# Patient Record
Sex: MALE | Race: WHITE
[De-identification: names, ages, dates, MRNs, and addresses within clinical notes are randomized per-mention and may not be internally consistent; named-entity substitution may affect disease eponyms.]

---

## 2017-03-27 ENCOUNTER — HOSPITAL ENCOUNTER (OUTPATIENT)
Dept: HOSPITAL 31 - C.SDS | Age: 67
Setting detail: OBSERVATION
LOS: 2 days | Discharge: HOME | End: 2017-03-29
Attending: SURGERY | Admitting: SURGERY
Payer: MEDICARE

## 2017-03-27 VITALS — BODY MASS INDEX: 29 KG/M2

## 2017-03-27 VITALS — RESPIRATION RATE: 20 BRPM

## 2017-03-27 DIAGNOSIS — K42.9: Primary | ICD-10-CM

## 2017-03-27 DIAGNOSIS — J45.909: ICD-10-CM

## 2017-03-27 DIAGNOSIS — K43.2: ICD-10-CM

## 2017-03-27 DIAGNOSIS — Z85.038: ICD-10-CM

## 2017-03-27 LAB — INR PPP: 1

## 2017-03-27 PROCEDURE — 97162 PT EVAL MOD COMPLEX 30 MIN: CPT

## 2017-03-27 PROCEDURE — 49568: CPT

## 2017-03-27 PROCEDURE — 36415 COLL VENOUS BLD VENIPUNCTURE: CPT

## 2017-03-27 PROCEDURE — 88302 TISSUE EXAM BY PATHOLOGIST: CPT

## 2017-03-27 PROCEDURE — 97116 GAIT TRAINING THERAPY: CPT

## 2017-03-27 PROCEDURE — 85610 PROTHROMBIN TIME: CPT

## 2017-03-27 PROCEDURE — 49560: CPT

## 2017-03-27 RX ADMIN — HYDROMORPHONE HYDROCHLORIDE PRN MG: 1 INJECTION, SOLUTION INTRAMUSCULAR; INTRAVENOUS; SUBCUTANEOUS at 12:11

## 2017-03-27 RX ADMIN — HYDROMORPHONE HYDROCHLORIDE PRN MG: 1 INJECTION, SOLUTION INTRAMUSCULAR; INTRAVENOUS; SUBCUTANEOUS at 12:25

## 2017-03-27 RX ADMIN — HYDROMORPHONE HYDROCHLORIDE PRN MG: 1 INJECTION, SOLUTION INTRAMUSCULAR; INTRAVENOUS; SUBCUTANEOUS at 13:09

## 2017-03-27 RX ADMIN — HYDROMORPHONE HYDROCHLORIDE PRN MG: 1 INJECTION, SOLUTION INTRAMUSCULAR; INTRAVENOUS; SUBCUTANEOUS at 12:00

## 2017-03-27 NOTE — OP
PROCEDURE DATE: 03/27/2017



PREOPERATIVE DIAGNOSES:  Incisional/umbilical hernia.



POSTOPERATIVE DIAGNOSES:  Incisional/umbilical hernia.



PROCEDURE CARRIED OUT:  Repair of incisional hernia with mesh.



SURGEON:  Roel Conti MD



ASSISTANT:  Dr. Forrest, resident



ANESTHESIOLOGIST:  Dr. Khalil



The patient is a 66-year-old man who had a history of previous colectomy done through a midline incis
ion.  He has developed an incisional hernia just at the umbilicus.



OPERATIVE FINDINGS:

1.  No bowel was involved in the hernia.

2.  There was no evidence of any bowel injury during careful dissection of the hernia sac and the int
ra-abdominal contents.

3.  An 11 x 14 mesh Ventrio mesh was used.



PROCEDURE:  The patient was given general anesthesia, intravenous antibiotics, Venodyne boots were ap
plied.  A midline incision was made encompassing the previous incision.  The hernia was dissected vera
e.  The sac was dissected and removed.  We then placed the mesh in a completely unfurled pattern into
 the peritoneal cavity, sutured it at both ends and then secured it to the adjacent fascia.  Skin was
 closed with a subcuticular closure.  Marcaine was injected.  Blood loss of the procedure was less th
an 50 mL.



OPERATION CARRIED OUT:  Repair of incisional hernia with a Ventrio mesh.





__________________________________________

Roel Conti Jr., MD







cc:Teodoro Colorado MD



DD: 03/27/2017 11:39:31  [id number]

TT: 03/27/2017 12:35:53

Job # [id number]

en

## 2017-03-27 NOTE — PCM.SURG1
Surgeon's Initial Post Op Note





- Surgeon's Notes


Surgeon: Dr. Conti


Assistant: Dr. Forrest PGY2; Christiano AYOUB III


Type of Anesthesia: General Endo


Anesthesia Administered By: Remi


Pre-Operative Diagnosis: Incisional hernia


Operative Findings: same


Post-Operative Diagnosis: same


Operation Performed: Incisional Hernia repair w/ Mesh


Specimen/Specimens Removed: hernia sac


Estimated Blood Loss: EBL {In ML}: 50


Blood Products Given: N/A


Drains Used: No Drains


Post-Op Condition: Good


Date of Surgery/Procedure: 03/27/17


Time of Surgery/Procedure: 11:50

## 2017-03-29 VITALS — DIASTOLIC BLOOD PRESSURE: 83 MMHG | SYSTOLIC BLOOD PRESSURE: 131 MMHG | HEART RATE: 101 BPM

## 2017-03-29 VITALS — TEMPERATURE: 98.3 F | OXYGEN SATURATION: 95 %

## 2017-03-29 NOTE — CON
DATE: 03/28/2017



The patient chief complaining of abdominal pain ____.  The patient has history of CA of the colon ___
_  asthma .  The patient is a nonsmoker, nondrinker.  The patient uses albuterol, Advair.  



PHYSICAL EXAMINATION:

GENERAL:  The patient is awake, alert, oriented.

VITAL SIGNS:  Temperature is 98 ____ 

HEENT:  Within normal limits.

NECK:  Supple.

CHEST:  Symmetrical.

CARDIAC:  Regular.

ABDOMEN:  Soft.

EXTREMITIES:  No edema.



The patient suffers from abdominal hernia.  The patient ____ asthma medication.





__________________________________________

Teodoro Colorado MD







cc:



DD: 03/28/2017 10:14:12  [id number]

TT: 03/28/2017 13:19:08

Confirmation # [id number]

Dictation # [id number]

rn



03/29/2017 06:50:54

## 2017-03-29 NOTE — CP.PCM.PN
Subjective





- Date & Time of Evaluation


Date of Evaluation: 03/29/17


Time of Evaluation: 10:11





- Subjective


Subjective: 


doing well


dc home


fu office 1 week





Objective





- Vital Signs/Intake and Output


Vital Signs (last 24 hours): 


 











Temp Pulse Resp BP Pulse Ox


 


 98.3 F   98 H  20   129/85   95 


 


 03/29/17 08:05  03/29/17 08:05  03/29/17 08:05  03/29/17 08:05  03/29/17 08:05








Intake and Output: 


 











 03/29/17 03/29/17





 06:59 18:59


 


Intake Total 150 


 


Output Total 450 


 


Balance -300 














- Medications


Medications: 


 Current Medications





Acetaminophen (Tylenol 325mg Tab)  650 mg PO Q6 PRN


   PRN Reason: Headache


   Last Admin: 03/27/17 22:51 Dose:  650 mg


Montelukast Sodium (Singulair)  10 mg PO DAILY [facility]


   Last Admin: 03/29/17 09:40 Dose:  10 mg


Morphine Sulfate (Morphine)  4 mg IVP Q4 PRN


   PRN Reason: Pain, moderate (4-7)


   Last Admin: 03/28/17 10:19 Dose:  4 mg


Ondansetron HCl (Zofran Inj)  4 mg IVP Q6 PRN


   PRN Reason: Nausea/Vomiting


Oxycodone/Acetaminophen (Percocet 5/325 Mg Tab)  2 tab PO Q4 PRN


   PRN Reason: Pain, moderate (4-7)


   Stop: 03/30/17 11:51


Warfarin Sodium (Coumadin)  7.5 mg PO 1800 ZEB


Zolpidem Tartrate (Ambien)  5 mg PO HS [facility]


   Last Admin: 03/29/17 00:28 Dose:  5 mg











- Labs


Labs: 


 











PT  10.9 SECONDS (9.7-12.2)   03/27/17  09:01    


 


INR  1.0   03/27/17  09:01

## 2017-03-30 NOTE — PN
DATE: 03/29/2017



The patient is improving.  Pain is less.  _____ controlled.  The patient will go home today.





__________________________________________

Teodoro Colorado MD







cc:



DD: 03/29/2017 10:12:48  [id number]

TT: 03/29/2017 10:27:46

Confirmation # [id number]

Dictation # [id number]

en

## 2017-08-02 ENCOUNTER — HOSPITAL ENCOUNTER (EMERGENCY)
Dept: HOSPITAL 31 - C.ER | Age: 67
Discharge: HOME | End: 2017-08-02
Payer: MEDICARE

## 2017-08-02 VITALS
OXYGEN SATURATION: 99 % | RESPIRATION RATE: 18 BRPM | DIASTOLIC BLOOD PRESSURE: 88 MMHG | SYSTOLIC BLOOD PRESSURE: 146 MMHG | TEMPERATURE: 97.8 F | HEART RATE: 72 BPM

## 2017-08-02 VITALS — BODY MASS INDEX: 29 KG/M2

## 2017-08-02 DIAGNOSIS — M54.5: Primary | ICD-10-CM

## 2017-08-02 PROCEDURE — 99283 EMERGENCY DEPT VISIT LOW MDM: CPT

## 2017-08-02 PROCEDURE — 96372 THER/PROPH/DIAG INJ SC/IM: CPT

## 2017-08-02 NOTE — C.PDOC
History Of Present Illness


67 y/o male presents to ED with complaints of lower back pain for 2 days. 

Patient states he has herniated disc and sometimes gets back pain that resolves 

with medication but this time there is no improvement. Patient reports taking 

Percocet at 5pm and Valium at 12pm with no relief. Patient denies fever, chills

, urinary symptoms, nausea, vomiting, diarrhea or any other associated 

symptoms.  


Time Seen by Provider: 08/02/17 18:51


Chief Complaint (Nursing): Back Pain


History Per: Patient


History/Exam Limitations: no limitations


Onset/Duration Of Symptoms: Days


Current Symptoms Are (Timing): Still Present


Previous Symptoms: Back Pain





Past Medical History


Reviewed: Historical Data, Nursing Documentation, Vital Signs


Vital Signs: 


 Last Vital Signs











Temp  97.8 F   08/02/17 18:43


 


Pulse  72   08/02/17 18:43


 


Resp  18   08/02/17 18:43


 


BP  146/88   08/02/17 18:43


 


Pulse Ox  99   08/02/17 20:05














- Medical History


PMH: Anemia, Arthritis (BACK EPIDURAL), Asthma, COPD (ASTHMA), Deep Vein 

Thrombosis (b/l), Kidney Stones, Rheumatoid Arthritis


   Comment Only: Gall Bladder Disease (gallstones)


Surgical History: Endoscopy





- [facility] Procedures








EXCISION OF CECUM, ENDO, DIAGN (10/23/16)


EXCISION OF STOMACH, ENDO, DIAGN (10/23/16)


INTRODUCTION OF NUTRITIONAL INTO PERIPH VEIN, PERC APPROACH (10/23/16)


RESECTION OF ASCENDING COLON, OPEN APPROACH (10/23/16)


TRANSFUSE NONAUT FRESH PLASMA IN PERIPH ART, PERC (10/23/16)


TRANSFUSE NONAUT RED BLOOD CELLS IN PERIPH ART, PERC (10/23/16)








Family History: States: Unknown Family Hx





- Social History


Hx Alcohol Use: No


Hx Substance Use: No





- Immunization History


Hx Tetanus Toxoid Vaccination: No


Hx Influenza Vaccination: No


Hx Pneumococcal Vaccination: No





Review Of Systems


Constitutional: Negative for: Fever, Chills


Cardiovascular: Negative for: Chest Pain


Respiratory: Negative for: Shortness of Breath


Gastrointestinal: Negative for: Nausea, Vomiting, Diarrhea


Genitourinary: Negative for: Dysuria, Incontinence


Musculoskeletal: Positive for: Back Pain.  Negative for: Leg Pain


Skin: Negative for: Rash





Physical Exam





- Physical Exam


Appears: Non-toxic, No Acute Distress


Skin: Normal Color, Warm, Dry, No Rash


Head: Atraumatic, Normacephalic


Eye(s): bilateral: Normal Inspection


Oral Mucosa: Moist


Neck: Normal ROM, Supple


Chest: Symmetrical


Cardiovascular: Rhythm Regular


Respiratory: Normal Breath Sounds


Back: No Muscle Spasm, No Paraspinal Tenderness, Other (Paralumbar Tenderness)


Extremity: Normal ROM, Capillary Refill (<2 seconds)


Neurological/Psych: Oriented x3, Normal Motor, Normal Sensation





ED Course And Treatment


O2 Sat by Pulse Oximetry: 99 (RA)


Pulse Ox Interpretation: Normal





Medical Decision Making


Medical Decision Making: 


Impression: low back pain





Plan:


* Toradol, Valium, Lidoderm





Progress, Reassess and Dispo:


Upon reevaluation patient reports pain is improving. Patient is ambulatory 

without any difficulty, and his cane


He feels comfortable with discharge





Disposition


Counseled Patient/Family Regarding: Diagnosis, Need For Followup, Rx Given





- Disposition


Referrals: 


Teodoro Gallegos MD [Staff Provider] - 


Disposition: HOME/ ROUTINE


Disposition Time: 20:01


Condition: STABLE


Additional Instructions: 


Follow up with your primary medical doctor or clinic in 2-5 days for further 

evaluation. Continue with your usual medications as prescribed. Apply patch for 

12 hours then remove for 12 hours. Return to the emergency department at any 

time if symptoms persist or worsen.


Prescriptions: 


Lidocaine 5% [Lidoderm] 1 ea TD Q12 #12 patch


Instructions:  Acute Low Back Pain (DC)


Forms:  [facility] (English)





- POA


Present On Arrival: None





- Clinical Impression


Clinical Impression: 


 Low back pain








- PA / NP / Resident Statement


MD/DO has reviewed & agrees with the documentation as recorded.





- Scribe Statement


The provider has reviewed the documentation as recorded by the Tad Mendoza





All medical record entries made by the Tad were at my direction and 

personally dictated by me. I have reviewed the chart and agree that the record 

accurately reflects my personal performance of the history, physical exam, 

medical decision making, and the department course for this patient. I have 

also personally directed, reviewed, and agree with the discharge instructions 

and disposition.

## 2017-09-12 ENCOUNTER — HOSPITAL ENCOUNTER (OUTPATIENT)
Dept: HOSPITAL 31 - C.ER | Age: 67
Setting detail: OBSERVATION
LOS: 1 days | Discharge: HOME | End: 2017-09-13
Attending: INTERNAL MEDICINE | Admitting: INTERNAL MEDICINE
Payer: MEDICARE

## 2017-09-12 VITALS — BODY MASS INDEX: 29 KG/M2

## 2017-09-12 DIAGNOSIS — K64.8: ICD-10-CM

## 2017-09-12 DIAGNOSIS — F41.9: ICD-10-CM

## 2017-09-12 DIAGNOSIS — K59.00: ICD-10-CM

## 2017-09-12 DIAGNOSIS — K57.90: ICD-10-CM

## 2017-09-12 DIAGNOSIS — I10: ICD-10-CM

## 2017-09-12 DIAGNOSIS — K52.9: Primary | ICD-10-CM

## 2017-09-12 LAB
ALBUMIN/GLOB SERPL: 1.3 {RATIO} (ref 1–2.1)
ALP SERPL-CCNC: 53 U/L (ref 38–126)
ALT SERPL-CCNC: 50 U/L (ref 21–72)
APTT BLD: 27 SECONDS (ref 21–34)
AST SERPL-CCNC: 31 U/L (ref 17–59)
BASOPHILS # BLD AUTO: 0 K/UL (ref 0–0.2)
BASOPHILS NFR BLD: 0.4 % (ref 0–2)
BILIRUB SERPL-MCNC: 1.1 MG/DL (ref 0.2–1.3)
BILIRUB UR-MCNC: NEGATIVE MG/DL
BUN SERPL-MCNC: 11 MG/DL (ref 9–20)
CALCIUM SERPL-MCNC: 8.9 MG/DL (ref 8.6–10.4)
CHLORIDE SERPL-SCNC: 105 MMOL/L (ref 98–107)
CO2 SERPL-SCNC: 26 MMOL/L (ref 22–30)
COLOR UR: YELLOW
EOSINOPHIL # BLD AUTO: 0.1 K/UL (ref 0–0.7)
EOSINOPHIL NFR BLD: 2.1 % (ref 0–4)
ERYTHROCYTE [DISTWIDTH] IN BLOOD BY AUTOMATED COUNT: 13.5 % (ref 11.5–14.5)
GLOBULIN SER-MCNC: 3 GM/DL (ref 2.2–3.9)
GLUCOSE SERPL-MCNC: 74 MG/DL (ref 75–110)
GLUCOSE UR STRIP-MCNC: NORMAL MG/DL
HCT VFR BLD CALC: 43.6 % (ref 35–51)
INR PPP: 1.2
KETONES UR STRIP-MCNC: NEGATIVE MG/DL
LEUKOCYTE ESTERASE UR-ACNC: (no result) LEU/UL
LYMPHOCYTES # BLD AUTO: 2.4 K/UL (ref 1–4.3)
LYMPHOCYTES NFR BLD AUTO: 37.6 % (ref 20–40)
MCH RBC QN AUTO: 32 PG (ref 27–31)
MCHC RBC AUTO-ENTMCNC: 34.5 G/DL (ref 33–37)
MCV RBC AUTO: 93 FL (ref 80–94)
MONOCYTES # BLD: 0.7 K/UL (ref 0–0.8)
MONOCYTES NFR BLD: 11.5 % (ref 0–10)
NRBC BLD AUTO-RTO: 0 % (ref 0–2)
PH UR STRIP: 7 [PH] (ref 5–8)
PLATELET # BLD: 216 K/UL (ref 130–400)
PMV BLD AUTO: 7.9 FL (ref 7.2–11.7)
POTASSIUM SERPL-SCNC: 4.1 MMOL/L (ref 3.6–5.2)
PROT SERPL-MCNC: 7.1 G/DL (ref 6.3–8.3)
PROT UR STRIP-MCNC: NEGATIVE MG/DL
RBC # UR STRIP: NEGATIVE /UL
RBC #/AREA URNS HPF: 1 /HPF (ref 0–3)
SODIUM SERPL-SCNC: 140 MMOL/L (ref 132–148)
SP GR UR STRIP: 1.01 (ref 1–1.03)
UROBILINOGEN UR-MCNC: NORMAL MG/DL (ref 0.2–1)
WBC # BLD AUTO: 6.4 K/UL (ref 4.8–10.8)
WBC #/AREA URNS HPF: 8 /HPF (ref 0–5)

## 2017-09-12 PROCEDURE — 74177 CT ABD & PELVIS W/CONTRAST: CPT

## 2017-09-12 PROCEDURE — 96374 THER/PROPH/DIAG INJ IV PUSH: CPT

## 2017-09-12 PROCEDURE — 99284 EMERGENCY DEPT VISIT MOD MDM: CPT

## 2017-09-12 PROCEDURE — 88305 TISSUE EXAM BY PATHOLOGIST: CPT

## 2017-09-12 PROCEDURE — 81001 URINALYSIS AUTO W/SCOPE: CPT

## 2017-09-12 PROCEDURE — 85025 COMPLETE CBC W/AUTO DIFF WBC: CPT

## 2017-09-12 PROCEDURE — 45380 COLONOSCOPY AND BIOPSY: CPT

## 2017-09-12 PROCEDURE — 36415 COLL VENOUS BLD VENIPUNCTURE: CPT

## 2017-09-12 PROCEDURE — 85610 PROTHROMBIN TIME: CPT

## 2017-09-12 PROCEDURE — 71010: CPT

## 2017-09-12 PROCEDURE — 88342 IMHCHEM/IMCYTCHM 1ST ANTB: CPT

## 2017-09-12 PROCEDURE — 80053 COMPREHEN METABOLIC PANEL: CPT

## 2017-09-12 PROCEDURE — 86850 RBC ANTIBODY SCREEN: CPT

## 2017-09-12 PROCEDURE — 86900 BLOOD TYPING SEROLOGIC ABO: CPT

## 2017-09-12 PROCEDURE — 94640 AIRWAY INHALATION TREATMENT: CPT

## 2017-09-12 PROCEDURE — 88313 SPECIAL STAINS GROUP 2: CPT

## 2017-09-12 PROCEDURE — 85730 THROMBOPLASTIN TIME PARTIAL: CPT

## 2017-09-12 RX ADMIN — FLUTICASONE PROPIONATE AND SALMETEROL SCH: 50; 250 POWDER RESPIRATORY (INHALATION) at 21:26

## 2017-09-12 RX ADMIN — ALBUTEROL SULFATE SCH: 90 AEROSOL, METERED RESPIRATORY (INHALATION) at 21:26

## 2017-09-12 NOTE — C.PDOC
History Of Present Illness





68 Y/O MALE, HISTORY OF COLON CANCER AND DVT, PRESENTS TO ED WITH C/O "RED SPOTS

" OF BLOOD IN STOOL SINCE SUNDAY (3 DAYS). PT STATES HE HAS ALSO RECENTLY BEEN 

CONSTIPATED. NOTES HE WAS ADVISED BY PMD TO STOP TAKING COUMADIN SUNDAY (LAST 

TAKEN SATURDAY NIGHT). PT ALSO C/O LEFT SIDED ABDOMINAL PAIN. DENIES RECTAL PAIN

, FEVER, CHILLS, DIZZINESS, CHEST PAIN, NVD. 





REVIEW OF PRIOR RECORDS: colonoscopy October, 2016 (Trevin Walsh) showing onon-

bleeding gastritis, polyp.





EXAM


NAD, NONTOXIC


ABD: LLQ TENDERNESS


Time Seen by Provider: 09/12/17 15:29


Chief Complaint (Nursing): GI Problem


History Per: Patient


History/Exam Limitations: no limitations


Onset/Duration Of Symptoms: Days


Current Symptoms Are (Timing): Still Present


Quality Of Discomfort: "Pain"


Associated Symptoms: denies: Nausea, Vomiting, Diarrhea


Recent travel outside of the [place]: No





Past Medical History


Reviewed: Historical Data, Nursing Documentation, Vital Signs


Vital Signs: 


 Last Vital Signs











Temp  97.9 F   09/13/17 15:10


 


Pulse  77   09/13/17 15:10


 


Resp  20   09/13/17 15:10


 


BP  123/67   09/13/17 15:10


 


Pulse Ox  98   09/15/17 09:26














- Medical History


PMH: Anemia, Arthritis (BACK EPIDURAL), Asthma, COPD (ASTHMA), Deep Vein 

Thrombosis (b/l), Kidney Stones, Rheumatoid Arthritis


   Comment Only: Gall Bladder Disease (gallstones)


Surgical History: Endoscopy





- [facility] Procedures








EXCISION OF CECUM, ENDO, DIAGN (10/23/16)


EXCISION OF STOMACH, ENDO, DIAGN (10/23/16)


INTRODUCTION OF NUTRITIONAL INTO PERIPH VEIN, PERC APPROACH (10/23/16)


RESECTION OF ASCENDING COLON, OPEN APPROACH (10/23/16)


TRANSFUSE NONAUT FRESH PLASMA IN PERIPH ART, PERC (10/23/16)


TRANSFUSE NONAUT RED BLOOD CELLS IN PERIPH ART, PERC (10/23/16)








Family History: States: Unknown Family Hx





- Social History


Hx Alcohol Use: No


Hx Substance Use: No





- Immunization History


Hx Tetanus Toxoid Vaccination: No


Hx Influenza Vaccination: No


Hx Pneumococcal Vaccination: No





Review Of Systems


Except As Marked, All Systems Reviewed And Found Negative.


Constitutional: Negative for: Fever, Chills


Cardiovascular: Negative for: Chest Pain


Respiratory: Negative for: Cough, Shortness of Breath, Wheezing


Gastrointestinal: Positive for: Abdominal Pain, Hematochezia.  Negative for: 

Hematemesis, Rectal Pain


Skin: Negative for: Rash





Physical Exam





- Physical Exam


Appears: Non-toxic, No Acute Distress


Skin: Normal Color, Warm, Dry


Head: Atraumatic, Normacephalic


Oral Mucosa: Moist


Chest: Symmetrical


Cardiovascular: Rhythm Regular


Respiratory: Normal Breath Sounds, No Rales, No Rhonchi, No Wheezing


Gastrointestinal/Abdominal: Soft, Tenderness (LLQ), No Distention, No Guarding, 

No Rebound


Back: Normal Inspection, No CVA Tenderness


Extremity: Normal ROM, Capillary Refill (< 2 SEC.)


Neurological/Psych: Oriented x3, Normal Speech, Normal Cognition





ED Course And Treatment





- Laboratory Results


Result Diagrams: 


 09/13/17 11:12





 09/13/17 11:12


O2 Sat by Pulse Oximetry: 98 (RA)


Pulse Ox Interpretation: Normal





Progress





- Re-Evaluation


Re-evaluation Note: 





09/12/17 15:55


TREATED WITH PEPCID, IVFS. LABS, CT ABDOMEN/PELVIS ORDERED








ED OBSERVATION


Date of observation admission: 09/12/17


Time of observation admission: 15:30





- Observation admission statement


Patient is being placed in observation because:: 





GI BLEED ABD PAIN





- Goals of Observation


Goals of observation are:: 





NEG ACUTE ABD, ACUTE ANEMIA





- Progress Note


Progress Note: 





09/12/17 18:28


NO RECUR GI BLEED SINCE PRIOR EVAL. VSS.


09/12/17 18:56


S/O DR YARED DE JESUS CT, DISPO





Disposition





- Disposition


Disposition: HOSPITALIZED


Disposition Time: 20:30


Condition: STABLE





- POA


Present On Arrival: None





- Clinical Impression


Clinical Impression: 


 GI bleed








- Scribe Statement


The provider has reviewed the documentation as recorded by the Tad Hannah





All medical record entries made by the Sariibdimple were at my direction and 

personally dictated by me. I have reviewed the chart and agree that the record 

accurately reflects my personal performance of the history, physical exam, 

medical decision making, and the department course for this patient. I have 

also personally directed, reviewed, and agree with the discharge instructions 

and disposition.

## 2017-09-12 NOTE — CT
EXAM:

  CT Abdomen and Pelvis With Intravenous Contrast



EXAM DATE/TIME:

  Exam ordered 9/12/2017 3:53 PM



CLINICAL HISTORY:

  67 years old, male; Pain and condition or disease; Cancer; Intestine, large; 

Abdominal pain; Flank; Left lower quadrant (llq); Additional info: Gi bleeding 

llq pain ho colon ca



TECHNIQUE:

  Axial computed tomography images of the abdomen and pelvis with intravenous 

contrast.  All CT scans at this facility use one or more dose reduction 

techniques, viz.: automated exposure control; ma/kV adjustment per patient size 

(including targeted exams where dose is matched to indication; i.e. head); or 

iterative reconstruction technique.

  Coronal and sagittal reformatted images were created and reviewed.



CONTRAST:

  100 mL of visipaque 320 administered intravenously.



COMPARISON:

  CT - ABD PELVIS PO   IV CONTRAST 2/18/2017 12:16:27 AM



FINDINGS:

  Lower thorax: No acute findings.



 ABDOMEN:

  Liver:  Unremarkable.  No mass.

  Gallbladder and bile ducts: Gallstones are present. No ductal dilation.

  Pancreas:  Unremarkable.  No mass.  No ductal dilation.

  Spleen:  Unremarkable.  No splenomegaly.

  Adrenals:  Unremarkable.  No mass.

  Kidneys and ureters:  There is a 2.8 cm simple cyst in the upper pole the 

right kidney. There is a 1.2 mm calcification in the this lower pole the right 

kidney  No hydronephrosis.

  Stomach and bowel:  There is a wide necked supraumbilical midline abdominal 

hernia containing fat and small bowel. No strangulation.  Surgical clips are 

noted at the base of the cecum  No obstruction.  No mucosal thickening.

  Appendix:  No findings to suggest acute appendicitis.



 PELVIS:

  Bladder:  Unremarkable.  No mass.

  Reproductive:  The prostate measures 5.1 x 3.2 x 4.6 cm.



 ABDOMEN and PELVIS:

  Intraperitoneal space:  Unremarkable.  No free air.  No significant fluid 

collection.

  Bones/joints:  There is a grade 1 spondylolisthesis at L23 or 3 mm of 

anterolisthesis of L3 with respect to L2.  This is a there is a levoscoliosis 

of the lumbar spine.  No acute fracture.  No dislocation.

  Soft tissues:  See above.

  Vasculature:  There is an inferior vena cava filter present.  No abdominal 

aortic aneurysm.

  Lymph nodes:  Unremarkable.  No enlarged lymph nodes.





IMPRESSION:     

1. Nonobstructing 1.2 mm calcification in the lower pole right kidney.



2. Right renal cyst.



3. Midabdominal hernia. No strangulation



4. Gallstones

## 2017-09-13 VITALS
DIASTOLIC BLOOD PRESSURE: 67 MMHG | SYSTOLIC BLOOD PRESSURE: 123 MMHG | HEART RATE: 77 BPM | TEMPERATURE: 97.9 F | RESPIRATION RATE: 20 BRPM

## 2017-09-13 LAB
ALBUMIN/GLOB SERPL: 1.3 {RATIO} (ref 1–2.1)
ALP SERPL-CCNC: 53 U/L (ref 38–126)
ALT SERPL-CCNC: 48 U/L (ref 21–72)
AST SERPL-CCNC: 38 U/L (ref 17–59)
BASOPHILS # BLD AUTO: 0 K/UL (ref 0–0.2)
BASOPHILS NFR BLD: 0.5 % (ref 0–2)
BILIRUB SERPL-MCNC: 1.4 MG/DL (ref 0.2–1.3)
BUN SERPL-MCNC: 10 MG/DL (ref 9–20)
CALCIUM SERPL-MCNC: 8.5 MG/DL (ref 8.6–10.4)
CHLORIDE SERPL-SCNC: 106 MMOL/L (ref 98–107)
CO2 SERPL-SCNC: 24 MMOL/L (ref 22–30)
EOSINOPHIL # BLD AUTO: 0.1 K/UL (ref 0–0.7)
EOSINOPHIL NFR BLD: 2.2 % (ref 0–4)
ERYTHROCYTE [DISTWIDTH] IN BLOOD BY AUTOMATED COUNT: 13.7 % (ref 11.5–14.5)
GLOBULIN SER-MCNC: 2.9 GM/DL (ref 2.2–3.9)
GLUCOSE SERPL-MCNC: 70 MG/DL (ref 75–110)
HCT VFR BLD CALC: 42.2 % (ref 35–51)
LYMPHOCYTES # BLD AUTO: 1.7 K/UL (ref 1–4.3)
LYMPHOCYTES NFR BLD AUTO: 32.2 % (ref 20–40)
MCH RBC QN AUTO: 32.1 PG (ref 27–31)
MCHC RBC AUTO-ENTMCNC: 34.7 G/DL (ref 33–37)
MCV RBC AUTO: 92.5 FL (ref 80–94)
MONOCYTES # BLD: 0.6 K/UL (ref 0–0.8)
MONOCYTES NFR BLD: 11.3 % (ref 0–10)
NRBC BLD AUTO-RTO: 0 % (ref 0–2)
PLATELET # BLD: 215 K/UL (ref 130–400)
PMV BLD AUTO: 7.7 FL (ref 7.2–11.7)
POTASSIUM SERPL-SCNC: 3.8 MMOL/L (ref 3.6–5.2)
PROT SERPL-MCNC: 6.6 G/DL (ref 6.3–8.3)
SODIUM SERPL-SCNC: 139 MMOL/L (ref 132–148)
WBC # BLD AUTO: 5.3 K/UL (ref 4.8–10.8)

## 2017-09-13 RX ADMIN — FLUTICASONE PROPIONATE AND SALMETEROL SCH PUFF: 50; 250 POWDER RESPIRATORY (INHALATION) at 13:40

## 2017-09-13 RX ADMIN — ALBUTEROL SULFATE SCH INHALER: 90 AEROSOL, METERED RESPIRATORY (INHALATION) at 13:40

## 2017-09-13 RX ADMIN — ALBUTEROL SULFATE SCH: 90 AEROSOL, METERED RESPIRATORY (INHALATION) at 01:34

## 2017-09-13 RX ADMIN — ALBUTEROL SULFATE SCH: 90 AEROSOL, METERED RESPIRATORY (INHALATION) at 08:00

## 2017-09-13 NOTE — CP.PCM.PN
Subjective





- Date & Time of Evaluation


Date of Evaluation: 09/13/17


Time of Evaluation: 09:15





- Subjective


Subjective: 





PGY2 medicine progress note for Dr. Gallegos





Patient is a 67 year old male with PMHx colon cancer, protein C deficiency with 

history of DVTs on coumadin who presents to the hospital with complaint of 

bright red blood mixed in with stool for the past 3 days.  Patient states he 

discontinued his coumadin on Sunday because of the bleeding as he states he was 

told in the past he has internal hemorrhoids and thought they may be bleeding.  

Patient was concerned that he still saw blood mixed in stool after holding 

coumadin so he came to the hospital.  Patient follows with his oncologist Dr. Tahmina Zhang last month and follows up monthly.  Patient states he also 

follows with his surgeon Dr. Conti, and had hernia repair with mes in March 2017.  Patient states colon cancer was diagnosed one year ago after he came to 

the hospital with chest pain.  At that time, patient was found to be anemic, 

and upon further investigation, the colon mass was found and resected. 





PMHx: colon cancer, anemia, asthma, arthritis, kidney stones


PSHx: right colectomy, IVC filter, hernia repair








Objective





- Vital Signs/Intake and Output


Vital Signs (last 24 hours): 


 











Temp Pulse Resp BP Pulse Ox


 


 98.7 F   67   18   136/84   99 


 


 09/13/17 13:01  09/13/17 13:01  09/13/17 13:01  09/13/17 13:01  09/13/17 13:01








Intake and Output: 


 











 09/13/17 09/13/17





 06:59 18:59


 


Intake Total  600


 


Balance  600














- Medications


Medications: 


 Current Medications





Albuterol (Ventolin Hfa 90 Mcg/Actuation (8 G))  2 puff IH RQ4 ZEB


Diazepam (Valium)  5 mg PO DAILY PRN


   PRN Reason: Anxiety


Lidocaine (Lidoderm)  1 ea TD DAILY [facility]


Montelukast Sodium (Singulair)  10 mg PO HS [facility]


Oxycodone/Acetaminophen (Percocet 5/325 Mg Tab)  1 tab PO Q6 PRN


   PRN Reason: Pain, moderate (4-7)


   Stop: 09/16/17 00:01


Pantoprazole Sodium (Protonix Inj)  40 mg IVP DAILY [facility]


   Last Admin: 09/13/17 11:00 Dose:  40 mg


Polyethylene Glycol (Miralax)  17 gm PO DAILY [facility]


Fluticasone/Salmeterol (Advair Diskus 250/50)  1 puff IH RQ12 ZEB


Zolpidem Tartrate (Ambien)  10 mg PO HS [facility]











- Labs


Labs: 


 





 09/13/17 11:12 





 09/13/17 11:12 





 











PT  13.6 SECONDS (9.7-12.2)  H  09/12/17  15:59    


 


INR  1.2   09/12/17  15:59    


 


APTT  27 SECONDS (21-34)   09/12/17  15:59    














- Constitutional


Appears: Non-toxic, No Acute Distress





- Head Exam


Head Exam: ATRAUMATIC, NORMOCEPHALIC





- Eye Exam


Eye Exam: EOMI





- ENT Exam


ENT Exam: Mucous Membranes Moist





- Respiratory Exam


Respiratory Exam: Clear to Ausculation Bilateral





- Cardiovascular Exam


Cardiovascular Exam: +S1, +S2





- GI/Abdominal Exam


GI & Abdominal Exam: Soft, Tenderness (mild LLQ), Normal Bowel Sounds.  absent: 

Distended, Firm, Guarding


Additional comments: 





well-healed midline incision.  hernia palpable with cough but reduces





- Extremities Exam


Extremities Exam: Normal Inspection.  absent: Pedal Edema





- Neurological Exam


Neurological Exam: Alert, Awake





- Psychiatric Exam


Psychiatric exam: Normal Affect





- Skin


Skin Exam: Dry, Warm





Assessment and Plan





- Assessment and Plan (Free Text)


Assessment: 





Blood in stool


Patient s/p colonoscopy with Dr. Walsh: colonoscopy to cecum: colitis, 

diverticulosis, internal hemorrhoids


continue protonix 40mg IV daily


continue miralax daily to avoid straining





CT abd/ pelvis with PO & IV contrast: 1.2mm stone lower right kidney, right 

renal cyst, mid abdomen hernia containing fat and small bowel, no 

strangulation.  no free air.  IVC filter present. 





Asthma


continue singulair, advair, albuterol inhaler





Arthritis


continue lidocaine patch





Anxiety


continue valium 5mg Po daiy prn





Hx protein C deficiency


holding coumadin for now





Prophylactic measure


SCDs


protonix IV daily





All medical management as per Dr. Gallegos

## 2017-09-14 NOTE — CON
DATE:  09/12/2017



From Dr. Francine Reddy to Dr. Teodoro Gallegos.



I was called for GI consultation by the admitting medical team.  The

patient is seen and fully examined on 09/12/2017 as requested by the

admitting medical staff for GI consultation.  The entire chart is reviewed.

All the available lab and radiology study results, current and previous

medication list, current and previous medical events, and allergy to

medication list ware reviewed.  Case discussed at length with the admitting

medical staff.



HISTORY OF PRESENT ILLNESS:  This is a 67-year-old male who was admitted to

the hospital through the emergency room with generalized weakness, malaise,

diarrhea, postprandial abdominal distention with recently reported body

weight loss.  The patient reported rectal bleeding, on and off with fresh

blood and black clots for the last 2-3 days prior to admission as well as

about 7-10 days also.  The patient had been on Coumadin, which was advised

to stop it prior to admission.  No chest pain, palpitations are reported. 

Significant complaint of shortness of breath.



PAST MEDICAL HISTORY:  Including, but not limited to:

1.  COPD.

2.  Deep venous thrombosis, has been on Coumadin.

3.  Renal stone.

4.  History of rheumatoid arthritis.

5.  Peptic ulcer disease.

6.  Known history of colon polyp.

7.  History of periods of anemia.



CURRENT MEDICATIONS:  Medication lists were reviewed.



ALLERGIES:  Allergic to medication unclear.



PAST SURGICAL HISTORY:  Including;

1.  Colonoscopy with polypectomy.

2.  The patient also has also history of gallbladder stone.



FAMILY HISTORY:  Unknown.



SOCIAL HISTORY:  No known history of cigarette smoking or alcohol intake.



After being admitted to the hospital, the patient was found to have normal

CBC, which include normal H&H, believed to be secondary to

hemoconcentration.  His blood glucose level initially was low at 74 with

low creatinine of 0.7.



PHYSICAL EXAMINATION:

GENERAL:  A 67-year-old male appeared to be awake, alert, and oriented.

VITAL SIGNS:  Afebrile with respiratory rate of 20 to 22, pulse 74, and

blood pressure 152/80.

HEENT:  Showed dry oral mucous membrane.  Nonicteric sclerae.

LYMPH NODES:  No lymphadenitis or lymphadenopathy.

LUNGS:  Scattered mild crepitation.  Breathing sounds are present

bilaterally.

HEART:  Positive S1 and S2.

ABDOMEN:  Soft with mild tenderness and distention, but mainly moderate

left-sided tenderness.  No mass or organomegaly.  No rebound tenderness or

guarding.

RECTAL:  Positive blood with trace of old and fresh blood on _____.

EXTREMITIES:  Mild lower extremity edematous changes.  No clubbing or

cyanosis.

NEUROLOGIC:  Peripheral pulses are present bilaterally, but weak.  No

reported new neurological deficits, sensory or motor.



IMPRESSION:

1.  Gastrointestinal bleeding, loss.

2.  Known history of colon polyp.

3.  Re-exacerbation of peptic ulcer disease.

4.  Multiple past medical history including, but not limited to chronic

obstructive pulmonary disease, rheumatoid arthritis, renal stone, deep

venous thrombosis with hypertension, as well as cholelithiasis, by history.



SUGGESTIONS:

1.  Continue current management.

2.  Correct an underlying coagulopathy if any.

3.  Keeping in mind the patient's known history of colon polyp, colonoscopy

is to be scheduled after adequate preparation.



Thank you for letting me to participate in your patient's case management. 

Further recommendation to followup of colonoscopy.







__________________________________________

Francine Reddy MD



DD:  09/13/2017 19:46:50

DT:  09/14/2017 0:21:02

Job # [id number]

## 2017-09-15 VITALS — OXYGEN SATURATION: 98 %

## 2019-04-19 ENCOUNTER — HOSPITAL ENCOUNTER (OUTPATIENT)
Dept: HOSPITAL 31 - C.ER | Age: 69
Setting detail: OBSERVATION
LOS: 1 days | Discharge: HOME | End: 2019-04-20
Attending: EMERGENCY MEDICINE | Admitting: EMERGENCY MEDICINE
Payer: COMMERCIAL

## 2019-04-19 VITALS — RESPIRATION RATE: 20 BRPM

## 2019-04-19 VITALS — BODY MASS INDEX: 29.7 KG/M2

## 2019-04-19 DIAGNOSIS — Z95.828: ICD-10-CM

## 2019-04-19 DIAGNOSIS — Z87.442: ICD-10-CM

## 2019-04-19 DIAGNOSIS — I82.411: ICD-10-CM

## 2019-04-19 DIAGNOSIS — Z85.038: ICD-10-CM

## 2019-04-19 DIAGNOSIS — I12.9: ICD-10-CM

## 2019-04-19 DIAGNOSIS — G89.29: ICD-10-CM

## 2019-04-19 DIAGNOSIS — I80.02: Primary | ICD-10-CM

## 2019-04-19 DIAGNOSIS — I82.512: ICD-10-CM

## 2019-04-19 DIAGNOSIS — Z79.01: ICD-10-CM

## 2019-04-19 DIAGNOSIS — D68.51: ICD-10-CM

## 2019-04-19 DIAGNOSIS — N18.9: ICD-10-CM

## 2019-04-19 DIAGNOSIS — J44.9: ICD-10-CM

## 2019-04-19 DIAGNOSIS — M06.9: ICD-10-CM

## 2019-04-19 DIAGNOSIS — M54.30: ICD-10-CM

## 2019-04-19 DIAGNOSIS — Z87.19: ICD-10-CM

## 2019-04-19 LAB
ALBUMIN SERPL-MCNC: 4.1 G/DL (ref 3.5–5)
ALBUMIN/GLOB SERPL: 1.5 {RATIO} (ref 1–2.1)
ALT SERPL-CCNC: 37 U/L (ref 21–72)
APTT BLD: 34 SECONDS (ref 21–34)
AST SERPL-CCNC: 43 U/L (ref 17–59)
BASOPHILS # BLD AUTO: 0 K/UL (ref 0–0.2)
BASOPHILS NFR BLD: 0.4 % (ref 0–2)
BILIRUB UR-MCNC: NEGATIVE MG/DL
BUN SERPL-MCNC: 19 MG/DL (ref 9–20)
CALCIUM SERPL-MCNC: 9.1 MG/DL (ref 8.6–10.4)
D DIMER: 874 NG/MLDDU (ref 0–243)
EOSINOPHIL # BLD AUTO: 0.1 K/UL (ref 0–0.7)
EOSINOPHIL NFR BLD: 1 % (ref 0–4)
ERYTHROCYTE [DISTWIDTH] IN BLOOD BY AUTOMATED COUNT: 14.2 % (ref 11.5–14.5)
ERYTHROCYTE [SEDIMENTATION RATE] IN BLOOD: 2 MM/HR (ref 0–15)
GFR NON-AFRICAN AMERICAN: > 60
GLUCOSE UR STRIP-MCNC: NORMAL MG/DL
HGB BLD-MCNC: 14.9 G/DL (ref 12–18)
INR PPP: 2.4
LEUKOCYTE ESTERASE UR-ACNC: (no result) LEU/UL
LYMPHOCYTES # BLD AUTO: 2.2 K/UL (ref 1–4.3)
LYMPHOCYTES NFR BLD AUTO: 28.1 % (ref 20–40)
MCH RBC QN AUTO: 32.5 PG (ref 27–31)
MCHC RBC AUTO-ENTMCNC: 34.4 G/DL (ref 33–37)
MCV RBC AUTO: 94.4 FL (ref 80–94)
MONOCYTES # BLD: 0.8 K/UL (ref 0–0.8)
MONOCYTES NFR BLD: 10.4 % (ref 0–10)
NEUTROPHILS # BLD: 4.7 K/UL (ref 1.8–7)
NEUTROPHILS NFR BLD AUTO: 60.1 % (ref 50–75)
NRBC BLD AUTO-RTO: 0.2 % (ref 0–2)
PH UR STRIP: 6 [PH] (ref 5–8)
PLATELET # BLD: 220 K/UL (ref 130–400)
PMV BLD AUTO: 7.7 FL (ref 7.2–11.7)
PROT UR STRIP-MCNC: NEGATIVE MG/DL
PROTHROMBIN TIME: 25.9 SECONDS (ref 9.7–12.2)
RBC # BLD AUTO: 4.59 MIL/UL (ref 4.4–5.9)
RBC # UR STRIP: NEGATIVE /UL
SP GR UR STRIP: 1.02 (ref 1–1.03)
SQUAMOUS EPITHIAL: < 1 /HPF (ref 0–5)
UROBILINOGEN UR-MCNC: NORMAL MG/DL (ref 0.2–1)
WBC # BLD AUTO: 7.8 K/UL (ref 4.8–10.8)

## 2019-04-19 PROCEDURE — 96372 THER/PROPH/DIAG INJ SC/IM: CPT

## 2019-04-19 PROCEDURE — 83735 ASSAY OF MAGNESIUM: CPT

## 2019-04-19 PROCEDURE — 99285 EMERGENCY DEPT VISIT HI MDM: CPT

## 2019-04-19 PROCEDURE — 86140 C-REACTIVE PROTEIN: CPT

## 2019-04-19 PROCEDURE — 85025 COMPLETE CBC W/AUTO DIFF WBC: CPT

## 2019-04-19 PROCEDURE — 71045 X-RAY EXAM CHEST 1 VIEW: CPT

## 2019-04-19 PROCEDURE — 82550 ASSAY OF CK (CPK): CPT

## 2019-04-19 PROCEDURE — 85730 THROMBOPLASTIN TIME PARTIAL: CPT

## 2019-04-19 PROCEDURE — 85378 FIBRIN DEGRADE SEMIQUANT: CPT

## 2019-04-19 PROCEDURE — 93970 EXTREMITY STUDY: CPT

## 2019-04-19 PROCEDURE — 96374 THER/PROPH/DIAG INJ IV PUSH: CPT

## 2019-04-19 PROCEDURE — 81001 URINALYSIS AUTO W/SCOPE: CPT

## 2019-04-19 PROCEDURE — 87040 BLOOD CULTURE FOR BACTERIA: CPT

## 2019-04-19 PROCEDURE — 80053 COMPREHEN METABOLIC PANEL: CPT

## 2019-04-19 PROCEDURE — 85651 RBC SED RATE NONAUTOMATED: CPT

## 2019-04-19 PROCEDURE — 36415 COLL VENOUS BLD VENIPUNCTURE: CPT

## 2019-04-19 PROCEDURE — 85610 PROTHROMBIN TIME: CPT

## 2019-04-19 PROCEDURE — 84100 ASSAY OF PHOSPHORUS: CPT

## 2019-04-19 PROCEDURE — 87086 URINE CULTURE/COLONY COUNT: CPT

## 2019-04-19 NOTE — C.PDOC
History Of Present Illness


67 y/o M c PMHx DVT s/p IVC filter on warfarin p/w L calf swelling, pain, 

erythema that he noticed over the past 4 days. Denies fever, chills, chest pain,

dyspnea, trauma, vomiting. Also reports chronic back pain that radiates down L 

leg which is consistent with past sciatica. 


Time Seen by Provider: 04/19/19 18:02


Chief Complaint (Nursing): Lower Extremity Problem/Injury





Past Medical History


Vital Signs: 





                                Last Vital Signs











Temp  99 F   04/19/19 16:43


 


Pulse  53 L  04/19/19 16:43


 


Resp  18   04/19/19 16:43


 


BP  134/78   04/19/19 16:43


 


Pulse Ox  95   04/19/19 16:43














- Medical History


PMH: Anemia, Arthritis (BACK EPIDURAL), Asthma, COPD (ASTHMA), Deep Vein Throm

bosis (b/l), Gall Bladder Disease (gallstones), Kidney Stones, Chronic Kidney 

Disease, Rheumatoid Arthritis


Surgical History: Endoscopy





- [facility] Procedures











EXCISION OF CECUM, ENDO, DIAGN (10/23/16)


EXCISION OF STOMACH, ENDO, DIAGN (10/23/16)


INTRODUCTION OF NUTRITIONAL INTO PERIPH VEIN, PERC APPROACH (10/23/16)


RESECTION OF ASCENDING COLON, OPEN APPROACH (10/23/16)


TRANSFUSE NONAUT FRESH PLASMA IN PERIPH ART, PERC (10/23/16)


TRANSFUSE NONAUT RED BLOOD CELLS IN PERIPH ART, PERC (10/23/16)








Family History: States: Unknown Family Hx





- Social History


Hx Alcohol Use: No


Hx Substance Use: No





- Immunization History


Hx Tetanus Toxoid Vaccination: No


Hx Influenza Vaccination: Yes


Hx Pneumococcal Vaccination: Yes





Review Of Systems


Except As Marked, All Systems Reviewed And Found Negative.


Constitutional: Negative for: Fever


Cardiovascular: Negative for: Chest Pain





Physical Exam





- Physical Exam


Additional Physical Exam Comments: 








Constitutional: No acute distress.


Head: Normocephalic. Atraumatic.


Eyes: PERRL.


ENT: Moist mucous membranes.


Neck: Supple.


Cardiovascular: Regular rate. Radial pulse 2+ bilaterally.


Chest: No tenderness.


Respiratory: Clear to auscultation bilaterally.


GI: Soft. Nontender. Nondistended.


Back: No midline tenderness.


Musculoskeletal: L calf with swelling, tenderness and posterior erythema that 

stops at knee.


Skin: As above.


Neurologic: Alert, no focal deficit.











ED Course And Treatment





- Laboratory Results


Result Diagrams: 


                                 04/19/19 19:03





                                 04/19/19 19:03


O2 Sat by Pulse Oximetry: 95





Medical Decision Making


Medical Decision Making: 


Patient with area of erythema which may be cellulitis but may be secondary to 

DVT. Dimer elevated, likely DVT even though patient therapeutic on warfarin, 

requires further anticoagulation as well as observation of erythema for spread 

and doppler to rule out DVT when they arrive in AM.





Disposition





- Disposition


Disposition: HOSPITALIZED


Disposition Time: 20:03


Condition: FAIR


Forms:  [facility] (English)





- Clinical Impression


Clinical Impression: 


 Calf swelling, Elevated d-dimer, Skin erythema

## 2019-04-19 NOTE — CP.PCM.HP
<Mark Tate - Last Filed: 04/20/19 00:05>





History of Present Illness





- History of Present Illness


History of Present Illness: 


H&P for hospitalist Dr. Wesley





Patient's Primary doctor Dr. Gallegos, hospitalist group covering Dr. Gallegos.





68M w/ PMhx of chronic DVTs (protein C deficiency) on coumadin & s/p IVC filter 

2004, colon cancer s/p resection of tumor, HTN, asthma, diverticulosis presents 

to ED for left leg pain. Patient reports pain began suddenly 3 days prior with 

redness to his calf area. Patient reports movement, including walking worsens 

the pain. Patient reports compliance with his coumadin. Patient denies recent 

travel, illness. Patient denies headaches, vision changes, chest pain, SOB, 

cough, congestion, abdominal pain, nausea, vomiting, diarrhea, constipation, 

fevers, chills. 





PMD: Dr. Gallegos


PMhx: chronic DVTs (protein C deficiency) on coumadin & s/p IVC filter 2004, 

colon cancer s/p resection of tumor, HTN, asthma, diverticulosis 


Meds: Norvasc 5 mg daily, coumadin 5 mg 5 days a week, 7.5 mg 2 days a week, 

percocet, ambien, albuterol, advair, singular


PSHx: right colectomy, IVC filter, hernia repair


SHx: Denies smoking, ETOH, illicit drug use 


Allergies: NKDA








Present on Admission





- Present on Admission


Any Indicators Present on Admission: Yes


History of DVT/PE: Yes


History of Uncontrolled Diabetes: No


Urinary Catheter: No


Decubitus Ulcer Present: No





Review of Systems





- Constitutional


Constitutional: absent: Chills, Fever





- EENT


Eyes: absent: Blurred Vision, Change in Vision


Ears: absent: Ear Pain


Nose/Mouth/Throat: absent: Nasal Congestion, Nasal Discharge





- Cardiovascular


Cardiovascular: absent: Chest Pain, Chest Pain at Rest, Dyspnea





- Respiratory


Respiratory: absent: Dyspnea, Dyspnea on Exertion





- Gastrointestinal


Gastrointestinal: absent: Abdominal Pain, Constipation





- Genitourinary


Genitourinary: absent: Change in Urinary Stream, Difficulty Urinating





- Musculoskeletal


Musculoskeletal: absent: Abnormal Gait, Arthralgias





- Integumentary


Integumentary: Skin Pain.  absent: Acne, Alopecia


Additional comments: 





Left lower extremity swelling, erythema 





- Neurological


Neurological: absent: Dizziness, Headaches





- Psychiatric


Psychiatric: absent: Confusion, Depression





Past Patient History





- Infectious Disease


Hx of Infectious Diseases: None





- Past Medical History & Family History


Past Medical History?: Yes





- Past Social History


Smoking Status: Never Smoked





- CARDIAC


Hx Cardiac Disorders: Yes





- PULMONARY


Hx Asthma: Yes


Hx Chronic Obstructive Pulmonary Disease (COPD): Yes (ASTHMA)





- NEUROLOGICAL


Hx Neurological Disorder: No





- HEENT


Hx HEENT Problems: Yes (eye allergies)





- RENAL


Hx Chronic Kidney Disease: Yes


Hx Kidney Stones: Yes





- ENDOCRINE/METABOLIC


Hx Endocrine Disorders: No





- HEMATOLOGICAL/ONCOLOGICAL


Hx Anemia: Yes





- INTEGUMENTARY


Hx Dermatological Problems: No





- MUSCULOSKELETAL/RHEUMATOLOGICAL


Hx Arthritis: Yes (BACK EPIDURAL)


Hx Rheumatoid Arthritis: Yes





- GASTROINTESTINAL


Hx Gall Bladder Disease: Yes (gallstones)





- GENITOURINARY/GYNECOLOGICAL


Hx Genitourinary Disorders: No





- PSYCHIATRIC


Hx Substance Use: No





- SURGICAL HISTORY


Hx Surgeries: Yes


Hx Herniorrhaphy: Yes (umbilical)


Other/Comment: colon cancer removed, hernia with mesh





- ANESTHESIA


Hx Anesthesia: Yes


Hx Anesthesia Reactions: No


Hx Malignant Hyperthermia: No





Meds


Allergies/Adverse Reactions: 


                                    Allergies











Allergy/AdvReac Type Severity Reaction Status Date / Time


 


No Known Allergies Allergy   Verified 04/19/19 16:43














Physical Exam





- Constitutional


Appears: Non-toxic, No Acute Distress





- Head Exam


Head Exam: NORMAL INSPECTION





- Eye Exam


Eye Exam: EOMI, Normal appearance





- ENT Exam


ENT Exam: Mucous Membranes Moist





- Neck Exam


Neck exam: Positive for: Normal Inspection





- Respiratory Exam


Respiratory Exam: Clear to Auscultation Bilateral, NORMAL BREATHING PATTERN.  

absent: Rales, Rhonchi, Wheezes





- Cardiovascular Exam


Cardiovascular Exam: +S1, +S2.  absent: Systolic Murmur





- GI/Abdominal Exam


GI & Abdominal Exam: Normal Bowel Sounds, Soft.  absent: Distended, Firm, 

Guarding, Tenderness





- Extremities Exam


Extremities exam: Positive for: tenderness, pedal pulses present.  Negative for:

pedal edema


Additional comments: 





erythema present on left calf in linear pattern


warm on palpation to area


pain on palpation


raised, interval like ridges of vessel along possibly small saphenous vein 








- Psychiatric Exam


Psychiatric exam: Normal Affect, Normal Mood





- Skin


Skin Exam: Dry, Intact, Normal Color, Warm





Results





- Vital Signs


Recent Vital Signs: 





                                Last Vital Signs











Temp  99 F   04/19/19 16:43


 


Pulse  65   04/19/19 20:30


 


Resp  15   04/19/19 20:30


 


BP  119/78   04/19/19 20:30


 


Pulse Ox  95   04/19/19 20:53














- Labs


Result Diagrams: 


                                 04/19/19 19:03





                                 04/19/19 19:03


Labs: 





                         Laboratory Results - last 24 hr











  04/19/19 04/19/19 04/19/19





  19:00 19:03 19:03


 


WBC   7.8 


 


RBC   4.59 


 


Hgb   14.9 


 


Hct   43.3 


 


MCV   94.4 H 


 


MCH   32.5 H 


 


MCHC   34.4 


 


RDW   14.2 


 


Plt Count   220 


 


MPV   7.7 


 


Neut % (Auto)   60.1 


 


Lymph % (Auto)   28.1 


 


Mono % (Auto)   10.4 H 


 


Eos % (Auto)   1.0 


 


Baso % (Auto)   0.4 


 


Neut # (Auto)   4.7 


 


Lymph # (Auto)   2.2 


 


Mono # (Auto)   0.8 


 


Eos # (Auto)   0.1 


 


Baso # (Auto)   0.0 


 


PT   


 


INR   


 


APTT   


 


D-Dimer, Quantitative   


 


Sodium    138


 


Potassium    4.2


 


Chloride    104


 


Carbon Dioxide    26


 


Anion Gap    12


 


BUN    19


 


Creatinine    0.8


 


Est GFR ( Amer)    > 60


 


Est GFR (Non-Af Amer)    > 60


 


Random Glucose    80


 


Calcium    9.1


 


Phosphorus    3.3


 


Magnesium    2.1


 


Total Bilirubin    0.5


 


AST    43


 


ALT    37


 


Alkaline Phosphatase    54


 


Total Creatine Kinase    62


 


C-React Prot High Sens  0.34 L  


 


Total Protein    6.8


 


Albumin    4.1


 


Globulin    2.7


 


Albumin/Globulin Ratio    1.5


 


Urine Color   


 


Urine Clarity   


 


Urine pH   


 


Ur Specific Gravity   


 


Urine Protein   


 


Urine Glucose (UA)   


 


Urine Ketones   


 


Urine Blood   


 


Urine Nitrate   


 


Urine Bilirubin   


 


Urine Urobilinogen   


 


Ur Leukocyte Esterase   


 


Urine WBC (Auto)   


 


Urine RBC (Auto)   


 


Ur Squamous Epith Cells   














  04/19/19 04/19/19





  19:03 19:19


 


WBC  


 


RBC  


 


Hgb  


 


Hct  


 


MCV  


 


MCH  


 


MCHC  


 


RDW  


 


Plt Count  


 


MPV  


 


Neut % (Auto)  


 


Lymph % (Auto)  


 


Mono % (Auto)  


 


Eos % (Auto)  


 


Baso % (Auto)  


 


Neut # (Auto)  


 


Lymph # (Auto)  


 


Mono # (Auto)  


 


Eos # (Auto)  


 


Baso # (Auto)  


 


PT  25.9 H 


 


INR  2.4 


 


APTT  34 


 


D-Dimer, Quantitative  874 H 


 


Sodium  


 


Potassium  


 


Chloride  


 


Carbon Dioxide  


 


Anion Gap  


 


BUN  


 


Creatinine  


 


Est GFR ( Amer)  


 


Est GFR (Non-Af Amer)  


 


Random Glucose  


 


Calcium  


 


Phosphorus  


 


Magnesium  


 


Total Bilirubin  


 


AST  


 


ALT  


 


Alkaline Phosphatase  


 


Total Creatine Kinase  


 


C-React Prot High Sens  


 


Total Protein  


 


Albumin  


 


Globulin  


 


Albumin/Globulin Ratio  


 


Urine Color   Yellow


 


Urine Clarity   Hazy


 


Urine pH   6.0


 


Ur Specific Gravity   1.019


 


Urine Protein   Negative


 


Urine Glucose (UA)   Normal


 


Urine Ketones   Negative


 


Urine Blood   Negative


 


Urine Nitrate   Negative


 


Urine Bilirubin   Negative


 


Urine Urobilinogen   Normal


 


Ur Leukocyte Esterase   Neg


 


Urine WBC (Auto)   1


 


Urine RBC (Auto)   3


 


Ur Squamous Epith Cells   < 1














Assessment & Plan





- Assessment and Plan (Free Text)


Assessment: 





68M w/ PMhx of chronic DVTs (protein C deficiency) on coumadin & s/p IVC filter 

2004, colon cancer s/p resection of tumor, HTN, asthma, diverticulosis presents 

to ED for left leg pain


Plan: 





Chronic DVT w/ failed therapy vs superficial thrombophlebitis


- elevated D-dimmer in 800s


- INR currently therapeutics at 2.4


- Lovenox 80 SC given in ED


- daily INR monitoring


- c/w home medication coumadin 5 mg HS, adjust according to AM 


- f/u venous duplex b/l


- start vanocmycin 1g Q12H & ceftriaxone 1gm daily for possible superficial skin

infection 





History of HTN


- c/w home medication norvasc 5 mg daily





History of asthma


- c/w home medication singulair 10 mg daily, pulmicort 


- duonebs PRN





History of difficulty to sleep


- c/w home medication ambien  mg PO HS





Ppx


- DVT: INR in theraputic range of 2.4, serial INRs 


 





<Pao Wesley N - Last Filed: 04/20/19 05:08>





Results





- Vital Signs


Recent Vital Signs: 





                                Last Vital Signs











Temp  98.1 F   04/19/19 23:51


 


Pulse  70   04/19/19 23:51


 


Resp  20   04/19/19 23:51


 


BP  108/62   04/19/19 23:51


 


Pulse Ox  96   04/20/19 02:20














- Labs


Result Diagrams: 


                                 04/19/19 19:03





                                 04/19/19 19:03


Labs: 





                         Laboratory Results - last 24 hr











  04/19/19 04/19/19 04/19/19





  19:00 19:03 19:03


 


WBC   7.8 


 


RBC   4.59 


 


Hgb   14.9 


 


Hct   43.3 


 


MCV   94.4 H 


 


MCH   32.5 H 


 


MCHC   34.4 


 


RDW   14.2 


 


Plt Count   220 


 


MPV   7.7 


 


Neut % (Auto)   60.1 


 


Lymph % (Auto)   28.1 


 


Mono % (Auto)   10.4 H 


 


Eos % (Auto)   1.0 


 


Baso % (Auto)   0.4 


 


Neut # (Auto)   4.7 


 


Lymph # (Auto)   2.2 


 


Mono # (Auto)   0.8 


 


Eos # (Auto)   0.1 


 


Baso # (Auto)   0.0 


 


ESR   2 


 


PT   


 


INR   


 


APTT   


 


D-Dimer, Quantitative   


 


Sodium    138


 


Potassium    4.2


 


Chloride    104


 


Carbon Dioxide    26


 


Anion Gap    12


 


BUN    19


 


Creatinine    0.8


 


Est GFR ( Amer)    > 60


 


Est GFR (Non-Af Amer)    > 60


 


Random Glucose    80


 


Calcium    9.1


 


Phosphorus    3.3


 


Magnesium    2.1


 


Total Bilirubin    0.5


 


AST    43


 


ALT    37


 


Alkaline Phosphatase    54


 


Total Creatine Kinase    62


 


C-React Prot High Sens  0.34 L  


 


Total Protein    6.8


 


Albumin    4.1


 


Globulin    2.7


 


Albumin/Globulin Ratio    1.5


 


Urine Color   


 


Urine Clarity   


 


Urine pH   


 


Ur Specific Gravity   


 


Urine Protein   


 


Urine Glucose (UA)   


 


Urine Ketones   


 


Urine Blood   


 


Urine Nitrate   


 


Urine Bilirubin   


 


Urine Urobilinogen   


 


Ur Leukocyte Esterase   


 


Urine WBC (Auto)   


 


Urine RBC (Auto)   


 


Ur Squamous Epith Cells   














  04/19/19 04/19/19





  19:03 19:19


 


WBC  


 


RBC  


 


Hgb  


 


Hct  


 


MCV  


 


MCH  


 


MCHC  


 


RDW  


 


Plt Count  


 


MPV  


 


Neut % (Auto)  


 


Lymph % (Auto)  


 


Mono % (Auto)  


 


Eos % (Auto)  


 


Baso % (Auto)  


 


Neut # (Auto)  


 


Lymph # (Auto)  


 


Mono # (Auto)  


 


Eos # (Auto)  


 


Baso # (Auto)  


 


ESR  


 


PT  25.9 H 


 


INR  2.4 


 


APTT  34 


 


D-Dimer, Quantitative  874 H 


 


Sodium  


 


Potassium  


 


Chloride  


 


Carbon Dioxide  


 


Anion Gap  


 


BUN  


 


Creatinine  


 


Est GFR ( Amer)  


 


Est GFR (Non-Af Amer)  


 


Random Glucose  


 


Calcium  


 


Phosphorus  


 


Magnesium  


 


Total Bilirubin  


 


AST  


 


ALT  


 


Alkaline Phosphatase  


 


Total Creatine Kinase  


 


C-React Prot High Sens  


 


Total Protein  


 


Albumin  


 


Globulin  


 


Albumin/Globulin Ratio  


 


Urine Color   Yellow


 


Urine Clarity   Hazy


 


Urine pH   6.0


 


Ur Specific Gravity   1.019


 


Urine Protein   Negative


 


Urine Glucose (UA)   Normal


 


Urine Ketones   Negative


 


Urine Blood   Negative


 


Urine Nitrate   Negative


 


Urine Bilirubin   Negative


 


Urine Urobilinogen   Normal


 


Ur Leukocyte Esterase   Neg


 


Urine WBC (Auto)   1


 


Urine RBC (Auto)   3


 


Ur Squamous Epith Cells   < 1














Addendum


Addendum: 





04/20/19 04:56


pt with recurrent dvt with  PE with multiple risk factors (colon ca resected 

,protein c defficiency .htn)  c/o leg pain x3 days . therapeutic PT ivc filter .

has increased D-Dimer 874 and low CRP.


A/P ? recurrent dvt / throbophlebitis .


pt was given lovonox in the ER .continue cumadine ,will get bilaterla extremity 

duplex scan in am to compare old vs new dvt . and antibiotics for 

thrombophlebitis with superficial cellulitis .

## 2019-04-19 NOTE — RAD
Date of service: 



04/19/2019



HISTORY:

 calf pain 



COMPARISON:

09/12/2017. 



FINDINGS:



LUNGS:

The lungs are well inflated and clear.



PLEURA:

No pleural effusions or pneumothorax. 



CARDIOVASCULAR:

There is mild cardiomegaly.  There is unfolding of the aorta.  No 

aortic atherosclerotic calcifications present. 



OSSEOUS STRUCTURES:

Within normal limits for the patient's age.



VISUALIZED UPPER ABDOMEN:

Normal.



OTHER FINDINGS:

None.



IMPRESSION:

No active pulmonary disease.

## 2019-04-20 VITALS
SYSTOLIC BLOOD PRESSURE: 130 MMHG | TEMPERATURE: 97.9 F | DIASTOLIC BLOOD PRESSURE: 89 MMHG | HEART RATE: 90 BPM | OXYGEN SATURATION: 95 %

## 2019-04-20 LAB
ALBUMIN SERPL-MCNC: 3.5 G/DL (ref 3.5–5)
ALBUMIN/GLOB SERPL: 1.6 {RATIO} (ref 1–2.1)
ALT SERPL-CCNC: 33 U/L (ref 21–72)
AST SERPL-CCNC: 35 U/L (ref 17–59)
BASOPHILS # BLD AUTO: 0 K/UL (ref 0–0.2)
BASOPHILS NFR BLD: 0.4 % (ref 0–2)
BUN SERPL-MCNC: 16 MG/DL (ref 9–20)
CALCIUM SERPL-MCNC: 8.3 MG/DL (ref 8.6–10.4)
EOSINOPHIL # BLD AUTO: 0.1 K/UL (ref 0–0.7)
EOSINOPHIL NFR BLD: 1.4 % (ref 0–4)
ERYTHROCYTE [DISTWIDTH] IN BLOOD BY AUTOMATED COUNT: 13.9 % (ref 11.5–14.5)
GFR NON-AFRICAN AMERICAN: > 60
HGB BLD-MCNC: 14.1 G/DL (ref 12–18)
LYMPHOCYTES # BLD AUTO: 2.4 K/UL (ref 1–4.3)
LYMPHOCYTES NFR BLD AUTO: 34.1 % (ref 20–40)
MCH RBC QN AUTO: 33.5 PG (ref 27–31)
MCHC RBC AUTO-ENTMCNC: 35.4 G/DL (ref 33–37)
MCV RBC AUTO: 94.5 FL (ref 80–94)
MONOCYTES # BLD: 0.7 K/UL (ref 0–0.8)
MONOCYTES NFR BLD: 10.4 % (ref 0–10)
NEUTROPHILS # BLD: 3.7 K/UL (ref 1.8–7)
NEUTROPHILS NFR BLD AUTO: 53.7 % (ref 50–75)
NRBC BLD AUTO-RTO: 0.1 % (ref 0–2)
PLATELET # BLD: 217 K/UL (ref 130–400)
PMV BLD AUTO: 7.6 FL (ref 7.2–11.7)
RBC # BLD AUTO: 4.22 MIL/UL (ref 4.4–5.9)
WBC # BLD AUTO: 7 K/UL (ref 4.8–10.8)

## 2019-04-20 RX ADMIN — VANCOMYCIN HYDROCHLORIDE SCH MLS/HR: 1 INJECTION, POWDER, LYOPHILIZED, FOR SOLUTION INTRAVENOUS at 00:00

## 2019-04-20 RX ADMIN — VANCOMYCIN HYDROCHLORIDE SCH MLS/HR: 1 INJECTION, POWDER, LYOPHILIZED, FOR SOLUTION INTRAVENOUS at 12:04

## 2019-04-20 NOTE — CP.PCM.PN
Subjective





- Date & Time of Evaluation


Date of Evaluation: 04/20/19


Time of Evaluation: 08:15





- Subjective


Subjective: 





Hospitalist covering Dr. Gallegos Progress Note





Patient was seen and examined at 8:15 AM 4/20/19 Bed 365B





Very pleasant 68 year old male who was admitted overnight for evaluation of 

possible Superficial Thrombophlebitis vs New DVT.





Currently upon FULL ROS:


Left calf pain has improved since last night and so has the redness


NO chest pain


NO palpitations


NO SOB/Cough


NO n/v/d/c


NO abdominal pain


NO new changes in vision


NO new changes in hearing


NO headache


NO burning/pain with urination


NO lightheadedness/dizziness


NO paresthesias





Exam:


General: NAD, Resting comfortably in bed, Speaking in full sentences without 

signs of respiratory distress


HEETN: NCA, EOMI, PERRLA, Scleral Icterus, NO pharyngeal erythema/exudate, NO 

lymphadenopathy, NO thyromegaly


Cardio: NS1 and NS2, NO M/R/G


Resp: CTA B/L, NO R/R/W


GI: BSx4, Soft, NT, ND, NO HSM, NO guarding/rebound tenderness


Ext: NO edema noted, Capillary Refill is 2 seconds, Pulses are strong and equal,

Multiple vericose veins in the lower legs bilaterally, Left upper inner calf 

area there is a palpable cord that the patient states is now not tender to 

palpation and over this area this is some warmth noted without any erythema


Neuro: CN II through XII are grossly intact





Assessments:





1). Superficial Thrombophlebitis vs New DVT Left LE


Status: Acute





2). Hx HTN


Status: Chronic





3). Hx Asthma


Status: Chronic





4). Hx Insomnia


Status: Chronic





5). Hx Protein Deficiency with Hx of DVT/IVC Filter


Status: Chronic








Based upon my examination, I explained to the patient that I believe that there 

is Superfical Thrombophlebitis and NO DVT. However, considering his history we 

need to make sure with Venous Doppler of the Left Lower Leg.





I have spoken with Nurse Arron caring for patient and she has called [facility] and 

patient is currently 3rd in line to be brought [facility] department.





I then spoke with [facility] Technician who informed me that the patient has chronic 

DVTs in the left and right leg. 


As patient is already therpeautic INR and on coumadin and has IVC filter, he 

will be discharged to home.


Stressed importance with follow up with hematologist (he does not follow with 

any hematologist as an outpatient despite having history of Protein C 

deficiency) and he was provided with address and phone number for Hematologist 

Dr. Reji Arriaga.


Please note that later in the afternoon, resident Dr. Michelle again went over and

stressed importance of follow up with Hematologist with patient's son who was at

the bedside.





I have confirmed with patient that he has enough of his home medications.





I have demonstrated to patient proper leg elevations and where to place heating 

pad to the left leg.





Please see below for the instructions that were gone over with the patient and 

placed in discharge order.











The following instructions were thoroughly explained to the patient:





1). Please make sure to do the following for your Left Leg:





Warm Compress or Heating Pad to your upper lower leg (upper calf area) for 30 

minutes every 4 hours.


Keep the left leg elevated at all times when laying down or sitting


You may take an Aspirin 81 mg 1 tablet by mouth 1x/day until you see Dr. Gallegos 

in 1 1/2 weeks 





2). You stated that you had enough of all of your home medications. Please cont

inue to take them as instructed by Dr. Gallegos. The only change that we would 

like for you to make is for your Albuterol Inhaler:





DO NOT take your Alubeterol Inhaler everyday. It is only to be used for severe 

shortness of breath. So if you get short of breath, stop what you are doing, sit

down, concentrate on your breathing with slow and deep breaths and if after a 1 

to 2 minutes,  you are still short of breath then you may use the Albuterol 

Inhaler.


The Advair is to be taken everyday as prescribed by your doctor





3). Please make sure to make sure that you make an appointment with Dr. Gallegos t

ramírez take in place in 1 1/2 weeks time.





4). Should redness of further swelling of the left leg occur, then please come 

back to the Emergency Room.





5). You stated that you are not being followed by a Hematologist (blood 

specialist). Therefore please make sure that you schedule an appointment with 

Dr. Reji Arriaga by calling his office at [phone] and his office is located 

at [address] [place] [address] in [place], [place].





Zaki Vargas D.O.





Objective





- Vital Signs/Intake and Output


Vital Signs (last 24 hours): 


                                        











Temp Pulse Resp BP Pulse Ox


 


 98.1 F   70   20   108/62   96 


 


 04/19/19 23:51  04/19/19 23:51  04/19/19 23:51  04/19/19 23:51  04/20/19 02:20








Intake and Output: 


                                        











 04/20/19 04/20/19





 06:59 18:59


 


Intake Total 600 


 


Output Total 800 


 


Balance -200 














- Medications


Medications: 


                               Current Medications





Albuterol/Ipratropium (Duoneb 3 Mg/0.5 Mg (3 Ml) Ud)  3 ml INH RQ4 PRN


   PRN Reason: Shortness of Breath


Amlodipine Besylate (Norvasc)  5 mg PO DAILY ZEB


Arformoterol Tartrate (Brovana)  15 mcg INH RQ12 ZEB


Budesonide (Pulmicort Respules)  0.5 mg INH RQ12 ZEB


Ceftriaxone Sodium 1 gm/ (Sodium Chloride)  100 mls @ 100 mls/hr IVPB DAILY ZBE;

Protocol


Vancomycin/Sodium Chloride (Vancomycin 1 Gm/Ns 200 Ml)  1 gm in 200 mls @ 133 

mls/hr IVPB Q12H ZEB; Protocol


   Stop: 04/25/19 00:01


   Last Admin: 04/20/19 00:00 Dose:  133 mls/hr


Montelukast Sodium (Singulair)  10 mg PO DAILY ZEB


Naphazoline HCl/Pheniramine Maleate (Naphcon-A Opht)  1 ml OU DAILY ZEB


Oxycodone/Acetaminophen (Percocet 5/325 Mg Tab)  1 tab PO Q6 PRN


   PRN Reason: Pain, moderate (4-7)


   Stop: 04/23/19 00:01


Polyethylene Glycol (Miralax)  17 gm PO DAILY ZEB


Zolpidem Tartrate (Ambien)  5 mg PO HS ZEB











- Labs


Labs: 


                                        





                                 04/20/19 06:56 





                                 04/20/19 06:56 





                                        











PT  25.9 SECONDS (9.7-12.2)  H  04/19/19  19:03    


 


INR  2.4   04/19/19  19:03    


 


APTT  34 SECONDS (21-34)   04/19/19  19:03

## 2019-04-20 NOTE — CP.PCM.DIS
Provider





- Provider


Date of Admission: 


04/19/19 20:50





Attending physician: 


Pao Wesley MD





Time Spent in preparation of Discharge (in minutes): 40





Hospital Course





- Lab Results


Lab Results: 


                                  Micro Results





04/19/19 19:19   Urine Random   Urine Culture - Final


                            No Growth (<1,000 CFU/ML)





                             Most Recent Lab Values











WBC  7.0 K/uL (4.8-10.8)   04/20/19  06:56    


 


RBC  4.22 Mil/uL (4.40-5.90)  L  04/20/19  06:56    


 


Hgb  14.1 g/dL (12.0-18.0)   04/20/19  06:56    


 


Hct  39.9 % (35.0-51.0)   04/20/19  06:56    


 


MCV  94.5 fL (80.0-94.0)  H  04/20/19  06:56    


 


MCH  33.5 pg (27.0-31.0)  H  04/20/19  06:56    


 


MCHC  35.4 g/dL (33.0-37.0)   04/20/19  06:56    


 


RDW  13.9 % (11.5-14.5)   04/20/19  06:56    


 


Plt Count  217 K/uL (130-400)   04/20/19  06:56    


 


MPV  7.6 fL (7.2-11.7)   04/20/19  06:56    


 


Neut % (Auto)  53.7 % (50.0-75.0)   04/20/19  06:56    


 


Lymph % (Auto)  34.1 % (20.0-40.0)   04/20/19  06:56    


 


Mono % (Auto)  10.4 % (0.0-10.0)  H  04/20/19  06:56    


 


Eos % (Auto)  1.4 % (0.0-4.0)   04/20/19  06:56    


 


Baso % (Auto)  0.4 % (0.0-2.0)   04/20/19  06:56    


 


Neut # (Auto)  3.7 K/uL (1.8-7.0)   04/20/19  06:56    


 


Lymph # (Auto)  2.4 K/uL (1.0-4.3)   04/20/19  06:56    


 


Mono # (Auto)  0.7 K/uL (0.0-0.8)   04/20/19  06:56    


 


Eos # (Auto)  0.1 K/uL (0.0-0.7)   04/20/19  06:56    


 


Baso # (Auto)  0.0 K/uL (0.0-0.2)   04/20/19  06:56    


 


ESR  2 mm/hr (0-15)   04/19/19  19:03    


 


PT  25.9 SECONDS (9.7-12.2)  H  04/19/19  19:03    


 


INR  2.4   04/19/19  19:03    


 


APTT  34 SECONDS (21-34)   04/19/19  19:03    


 


D-Dimer, Quantitative  874 ng/mlDDU (0-243)  H  04/19/19  19:03    


 


Sodium  136 mmol/L (132-148)   04/20/19  06:56    


 


Potassium  3.7 mmol/L (3.6-5.2)   04/20/19  06:56    


 


Chloride  106 mmol/L ()   04/20/19  06:56    


 


Carbon Dioxide  26 mmol/L (22-30)   04/20/19  06:56    


 


Anion Gap  8  (10-20)  L  04/20/19  06:56    


 


BUN  16 mg/dL (9-20)   04/20/19  06:56    


 


Creatinine  0.7 mg/dL (0.8-1.5)  L  04/20/19  06:56    


 


Est GFR ( Amer)  > 60   04/20/19  06:56    


 


Est GFR (Non-Af Amer)  > 60   04/20/19  06:56    


 


Random Glucose  89 mg/dL ()   04/20/19  06:56    


 


Calcium  8.3 mg/dl (8.6-10.4)  L  04/20/19  06:56    


 


Phosphorus  3.7 mg/dL (2.5-4.5)   04/20/19  06:56    


 


Magnesium  2.1 mg/dL (1.6-2.3)   04/20/19  06:56    


 


Total Bilirubin  0.4 mg/dL (0.2-1.3)   04/20/19  06:56    


 


AST  35 U/L (17-59)   04/20/19  06:56    


 


ALT  33 U/L (21-72)   04/20/19  06:56    


 


Alkaline Phosphatase  48 U/L ()   04/20/19  06:56    


 


Total Creatine Kinase  62 U/L ()   04/19/19  19:03    


 


C-React Prot High Sens  0.34 mg/L (1.00-3.00)  L  04/19/19  19:00    


 


Total Protein  5.8 g/dL (6.3-8.3)  L  04/20/19  06:56    


 


Albumin  3.5 g/dL (3.5-5.0)   04/20/19  06:56    


 


Globulin  2.3 gm/dL (2.2-3.9)   04/20/19  06:56    


 


Albumin/Globulin Ratio  1.6  (1.0-2.1)   04/20/19  06:56    


 


Urine Color  Yellow  (YELLOW)   04/19/19  19:19    


 


Urine Clarity  Hazy  (Clear)   04/19/19  19:19    


 


Urine pH  6.0  (5.0-8.0)   04/19/19  19:19    


 


Ur Specific Gravity  1.019  (1.003-1.030)   04/19/19  19:19    


 


Urine Protein  Negative mg/dL (NEGATIVE)   04/19/19  19:19    


 


Urine Glucose (UA)  Normal mg/dL (Normal)   04/19/19  19:19    


 


Urine Ketones  Negative mg/dL (NEGATIVE)   04/19/19  19:19    


 


Urine Blood  Negative  (NEGATIVE)   04/19/19  19:19    


 


Urine Nitrate  Negative  (NEGATIVE)   04/19/19  19:19    


 


Urine Bilirubin  Negative  (NEGATIVE)   04/19/19  19:19    


 


Urine Urobilinogen  Normal mg/dL (0.2-1.0)   04/19/19  19:19    


 


Ur Leukocyte Esterase  Neg Randa/uL (Negative)   04/19/19  19:19    


 


Urine WBC (Auto)  1 /hpf (0-5)   04/19/19  19:19    


 


Urine RBC (Auto)  3 /hpf (0-3)   04/19/19  19:19    


 


Ur Squamous Epith Cells  < 1 /hpf (0-5)   04/19/19  19:19    














- Hospital Course


Hospital Course: 





Hospitalist covering for Dr. Gallegos Discharge Note





Patient was seen and examined at 8:15 AM 4/20/19 Bed 365B





Very pleasant 68 year old male who was admitted overnight for evaluation of 

possible Superficial Thrombophlebitis vs New DVT. He was found to have bilateral

DVTs of the lower extremities (he has a history of this) as per preliminary 

reading. He is already on Coumadin and INR is 2.1 and he already has IVC filter 

in place. He will need to follow up with Hematology (stated that he currently 

does not follow with a Hematologist) as an outpatient for this as well as his 

history of Protein C deficiency.





Currently upon FULL ROS:


Left calf pain has improved since last night and so has the redness


NO chest pain


NO palpitations


NO SOB/Cough


NO n/v/d/c


NO abdominal pain


NO new changes in vision


NO new changes in hearing


NO headache


NO burning/pain with urination


NO lightheadedness/dizziness


NO paresthesias





Exam:


General: NAD, Resting comfortably in bed, Speaking in full sentences without 

signs of respiratory distress


HEETN: NCA, EOMI, PERRLA, Scleral Icterus, NO pharyngeal erythema/exudate, NO 

lymphadenopathy, NO thyromegaly


Cardio: NS1 and NS2, NO M/R/G


Resp: CTA B/L, NO R/R/W


GI: BSx4, Soft, NT, ND, NO HSM, NO guarding/rebound tenderness


Ext: NO edema noted, Capillary Refill is 2 seconds, Pulses are strong and equal,

Multiple vericose veins in the lower legs bilaterally, Left upper inner calf 

area there is a palpable cord that the patient states is now not tender to 

palpation and over this area this is some warmth noted without any erythema


Neuro: CN II through XII are grossly intact





Assessments:





1). Superficial Thrombophlebitis vs New DVT Left LE


Status: Acute





2). Hx HTN


Status: Chronic





3). Hx Asthma


Status: Chronic





4). Hx Insomnia


Status: Chronic





5). Hx Protein Deficiency with Hx of DVT/IVC Filter


Status: Chronic








Based upon my examination, I explained to the patient that I believe that there 

is Superfical Thrombophlebitis present. However, considering his history we need

to make sure with Venous Doppler of the Left Lower Leg.





I have spoken with Nurse Arron caring for patient and she has called [facility] and 

patient is currently 3rd in line to be brought [facility] department.





I then spoke with [facility] Technician who informed me that the patient has chronic 

DVTs in the left and right leg. 


As patient is already therpeautic INR and on coumadin and has IVC filter, he 

will be discharged to home.


Stressed importance with follow up with hematologist (he does not follow with 

any hematologist as an outpatient despite having history of Protein C 

deficiency) and he was provided with address and phone number for Hematologist 

Dr. Reji Arriaga.


Please note that later in the afternoon, resident Dr. Michelle again went over and

stressed importance of follow up with Hematologist with patient's son who was at

the bedside.





I have confirmed with patient that he has enough of his home medications.





I have demonstrated to patient proper leg elevations and where to place heating 

pad to the left leg.





Please see below for the instructions that were gone over with the patient and 

placed in discharge order.











The following instructions were thoroughly explained to the patient:





1). Please make sure to do the following for your Left Leg:





Warm Compress or Heating Pad to your upper lower leg (upper calf area) for 30 

minutes every 4 hours.


Keep the left leg elevated at all times when laying down or sitting


You may take an Aspirin 81 mg 1 tablet by mouth 1x/day until you see Dr. Gallegos 

in 1 1/2 weeks 





2). You stated that you had enough of all of your home medications. Please 

continue to take them as instructed by Dr. Gallegos. The only change that we would

like for you to make is for your Albuterol Inhaler:





DO NOT take your Alubeterol Inhaler everyday. It is only to be used for severe 

shortness of breath. So if you get short of breath, stop what you are doing, sit

down, concentrate on your breathing with slow and deep breaths and if after a 1 

to 2 minutes,  you are still short of breath then you may use the Albuterol 

Inhaler.


The Advair is to be taken everyday as prescribed by your doctor





3). Please make sure to make sure that you make an appointment with Dr. Gallegos 

to take in place in 1 1/2 weeks time.





4). Should redness of further swelling of the left leg occur, then please come 

back to the Emergency Room.





5). You stated that you are not being followed by a Hematologist (blood 

specialist). Therefore please make sure that you schedule an appointment with 

Dr. Reji Arriaga by calling his office at [phone] and his office is located 

at [address] [place] [address] in [place], [place].





Zaki Vargas D.O.





Discharge Exam





- Head Exam


Head Exam: NORMAL INSPECTION





Discharge Plan





- Follow Up Plan


Condition: FAIR


Disposition: HOME/ ROUTINE


Instructions:  Deep Vein Thrombosis (Blood Clots in the Legs), Troponin Test


Additional Instructions: 


The following instructions were explained to the patient:





1). Please make sure to do the following for your Left Leg:





Warm Compress or Heating Pad to your upper lower leg (upper calf area) for 30 

minutes every 4 hours.


Keep the left leg elevated at all times when laying down or sitting


You may take an Aspirin 81 mg 1 tablet by mouth 1x/day until you see Dr. Gallegos 

in 1 1/2 weeks 





2). You stated that you had enough of all of your home medications. Please 

continue to take them as instructed by Dr. Gallegos. The only change that we would

like for you to make is for your Albuterol Inhaler:





DO NOT take your Alubeterol Inhaler everyday. It is only to be used for severe 

shortness of breath. So if you get short of breath, stop what you are doing, sit

down, concentrate on your breathing with slow and deep breaths and if after a 1 

to 2 minutes,  you are still short of breath then you may use the Albuterol 

Inhaler.


The Advair is to be taken everyday as prescribed by your doctor





3). Please make sure to make sure that you make an appointment with Dr. Gallegos 

to take in place in 1 1/2 weeks time.





4). Should redness of further swelling of the left leg occur, then please come 

back to the Emergency Room.





5). You stated that you are not being followed by a Hematologist (blood 

specialist). Therefore please make sure that you schedule an appointment with 

Dr. Reji Arriaga by calling his office at [phone] and his office is located 

at [address] [facility] in [place], [place].





Zaki Vargas D.O.


Referrals: 


Teodoro Gallegos MD [Staff Provider] -

## 2019-04-22 NOTE — VASCLAB
Date of service: 



04/20/2019



PROCEDURE:  Lower Extremity Venous Duplex Exam.



HISTORY:

Left LE swelling, hx of dvts 



PRIORS:

Last exam on 1/21/2004, abnormal.



TECHNIQUE:

Bilateral common femoral, femoral, popliteal and posterior tibial, 

peroneal and great saphenous veins were evaluated. Flow was assessed 

with color Doppler, compressibility, assessment of phasic flow and 

augmentation response.



Report prepared by   LISA Gonzalez



FINDINGS:



RIGHT:

1. Common Femoral Vein: 



1.1. Compressibility - Partial: Thrombus -  Acute : Flow - Reduced : 

Reflux - Mild.



2. Femoral Vein:



2.1. Compressibility - Partial: Thrombus -  Acute : Flow - Reduced  

Reflux - Mild.



3. Popliteal Vein: 



3.1. Compressibility - Partial: Thrombus - Acute :  Flow - Reduced : 

Reflux - Mild.



4. Posterior Tibial Vein: 



4.1. Compressibility - Fully compressible: Thrombus -  None: Flow - 

Phasic: Augmentation -Normal: Reflux - None.



5. Peroneal Vein:



5.1. Compressibility - Fully compressible: Thrombus -  None: Flow - 

Phasic: Augmentation -Normal: Reflux - None.



6. Great Saphenous Vein:

6.1. Previously harvested.





LEFT:

1. Common Femoral Vein:



1.1.  Compressibility - Fully compressible: Thrombus -  None: Flow - 

Phasic: Augmentation -Normal: Reflux - None.



2. Femoral Vein:



2.1.  Compressibility - Partial: Thrombus -  Chronic: Flow - Phasic: 

Augmentation -Normal: Reflux - None.



3. Popliteal Vein:



3.1.  Compressibility - Partial: Thrombus -  Chronic : Flow - Phasic 

: Reflux - Moderate.



4. Posterior Tibial Vein: (One of the paired veins was not 

compressible)



4.1.  Compressibility - Incompressible: Thrombus -  Chronic: Flow - 

Reduced : Augmentation -Normal: Reflux - None.



5. Peroneal Vein:



5.1.  Compressibility - Fully compressible: Thrombus -  None: Flow - 

Phasic: Augmentation -Normal: Reflux - None.



6. Great Saphenous Vein:

6.1.  Compressibility - Incompressible: Thrombus -  Chronic: Flow - 

Phasic: Augmentation - Normal: Reflux - None.





OTHER FINDINGS:  Findings were reported to Dr. Vargas (3 tower) at 9: 

51 a.m.



IMPRESSION:

Right: 



Partial acute deep vein thrombosis of the right common femoral, 

superficial femoral and popliteal veins.



Left: 



Partial chronic deep vein thrombosis of the left superficial femoral, 

popliteal and posterior tibial veins.  Superficial phlebitis of the 

left great saphenous vein at the calf level.



Valvular incompetence noted bilaterally.